# Patient Record
Sex: MALE | Race: WHITE | NOT HISPANIC OR LATINO | Employment: OTHER | URBAN - METROPOLITAN AREA
[De-identification: names, ages, dates, MRNs, and addresses within clinical notes are randomized per-mention and may not be internally consistent; named-entity substitution may affect disease eponyms.]

---

## 2021-11-17 NOTE — PATIENT DISCUSSION
**Patient needs latest arrival if possible - if afternoon surgery day pops up, patient would prefer**.

## 2022-03-04 NOTE — PATIENT DISCUSSION
1 week PO: Patient is doing well post-operatively. The importance of post-op drop compliance was emphasized. Drop scheduled reviewed with patient. Patient to call if any visual changes or concerns.
Follow closely for secondary glaucoma.
Instructed to call immediately if any new distortion, blurring, decreased vision or eye pain.
Looks like SN60WF - Monitor.
MVI/AREDS discussed. Patient to call if any changes in vision.
Monitor.
Patient advised to call if any problems, questions, or concerns.
Patient made aware of 24/7 emergency services.
Recommend Artificial Tears 2-4x/day OU.
Recommend baseline HVF and OCT RNFL in the future with Dr. Galo Emerson.
Recommended observation.
Used to take eye vitamins. Recommend continuing.
Yes

## 2022-04-08 ENCOUNTER — NEW PATIENT (OUTPATIENT)
Dept: URBAN - METROPOLITAN AREA CLINIC 43 | Facility: CLINIC | Age: 87
End: 2022-04-08

## 2022-04-08 DIAGNOSIS — H43.813: ICD-10-CM

## 2022-04-08 DIAGNOSIS — H53.2: ICD-10-CM

## 2022-04-08 DIAGNOSIS — H52.4: ICD-10-CM

## 2022-04-08 DIAGNOSIS — H04.123: ICD-10-CM

## 2022-04-08 PROCEDURE — 92004 COMPRE OPH EXAM NEW PT 1/>: CPT

## 2022-04-08 PROCEDURE — 92015 DETERMINE REFRACTIVE STATE: CPT

## 2022-04-08 ASSESSMENT — TONOMETRY
OD_IOP_MMHG: 15
OS_IOP_MMHG: 15

## 2022-04-08 ASSESSMENT — VISUAL ACUITY
OD_BAT: 20/40
OD_CC: J3
OU_CC: J3
OS_CC: J6
OU_CC: 20/30
OU_SC: 20/40+2
OS_SC: 20/40-1
OS_CC: 20/40
OD_SC: 20/30-2
OS_BAT: 20/40
OD_CC: 20/30

## 2022-04-08 ASSESSMENT — KERATOMETRY
OD_AXISANGLE2_DEGREES: 113
OS_K1POWER_DIOPTERS: 41.25
OD_K1POWER_DIOPTERS: 41.50
OD_K2POWER_DIOPTERS: 42.25
OS_AXISANGLE_DEGREES: 128
OD_AXISANGLE_DEGREES: 23
OS_AXISANGLE2_DEGREES: 38
OS_K2POWER_DIOPTERS: 41.50

## 2024-01-24 ENCOUNTER — COMPREHENSIVE EXAM (OUTPATIENT)
Dept: URBAN - METROPOLITAN AREA CLINIC 43 | Facility: CLINIC | Age: 89
End: 2024-01-24

## 2024-01-24 DIAGNOSIS — H04.123: ICD-10-CM

## 2024-01-24 DIAGNOSIS — H43.813: ICD-10-CM

## 2024-01-24 DIAGNOSIS — H53.2: ICD-10-CM

## 2024-01-24 DIAGNOSIS — H52.4: ICD-10-CM

## 2024-01-24 PROCEDURE — 92014 COMPRE OPH EXAM EST PT 1/>: CPT

## 2024-01-24 PROCEDURE — 92015 DETERMINE REFRACTIVE STATE: CPT

## 2024-01-24 ASSESSMENT — KERATOMETRY
OD_AXISANGLE2_DEGREES: 113
OS_AXISANGLE_DEGREES: 128
OS_K2POWER_DIOPTERS: 41.50
OD_AXISANGLE_DEGREES: 23
OD_K2POWER_DIOPTERS: 42.25
OD_K1POWER_DIOPTERS: 41.50
OS_AXISANGLE2_DEGREES: 38
OS_K1POWER_DIOPTERS: 41.25

## 2024-01-24 ASSESSMENT — VISUAL ACUITY
OU_CC: 20/25-2
OD_CC: J3
OD_CC: 20/25-2
OS_SC: J8
OD_SC: J8
OS_CC: J2
OS_SC: 20/30
OU_CC: J2
OD_SC: 20/40+2
OS_CC: 20/25-1

## 2024-01-24 ASSESSMENT — TONOMETRY
OS_IOP_MMHG: 12
OD_IOP_MMHG: 12